# Patient Record
Sex: FEMALE | Race: BLACK OR AFRICAN AMERICAN | NOT HISPANIC OR LATINO | URBAN - METROPOLITAN AREA
[De-identification: names, ages, dates, MRNs, and addresses within clinical notes are randomized per-mention and may not be internally consistent; named-entity substitution may affect disease eponyms.]

---

## 2023-03-16 ENCOUNTER — OFFICE VISIT (OUTPATIENT)
Dept: PEDIATRICS | Facility: CLINIC | Age: 23
End: 2023-03-16
Payer: COMMERCIAL

## 2023-03-16 DIAGNOSIS — M54.9 BACK PAIN, UNSPECIFIED BACK LOCATION, UNSPECIFIED BACK PAIN LATERALITY, UNSPECIFIED CHRONICITY: Primary | ICD-10-CM
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
## 2023-03-16 PROCEDURE — 99213 OFFICE O/P EST LOW 20 MIN: CPT | Performed by: PEDIATRICS
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
## 2023-04-27 ENCOUNTER — TELEPHONE (OUTPATIENT)
Dept: PEDIATRICS | Facility: CLINIC | Age: 23
End: 2023-04-27
Payer: COMMERCIAL

## 2023-04-27 NOTE — TELEPHONE ENCOUNTER
TT MOM. THOUGHT SHE HAD MELANOMA. CALLED MOM IN [facility]. NAIL LOOKED DARK. SAW  DOCTOR AT [facility] TODAY AND WAS TOLD JUST BECAUSE SHE'S DARK-SKINNED. -CW

## 2025-05-08 ENCOUNTER — APPOINTMENT (OUTPATIENT)
Dept: PEDIATRICS | Facility: CLINIC | Age: 25
End: 2025-05-08
Payer: COMMERCIAL

## 2025-07-16 ENCOUNTER — APPOINTMENT (OUTPATIENT)
Dept: PEDIATRICS | Facility: CLINIC | Age: 25
End: 2025-07-16
Payer: COMMERCIAL

## 2025-07-16 VITALS
HEIGHT: 66 IN | DIASTOLIC BLOOD PRESSURE: 71 MMHG | SYSTOLIC BLOOD PRESSURE: 111 MMHG | BODY MASS INDEX: 20.06 KG/M2 | HEART RATE: 66 BPM | WEIGHT: 124.8 LBS

## 2025-07-16 DIAGNOSIS — Z91.89 IMMUNIZATION OVERDUE: ICD-10-CM

## 2025-07-16 DIAGNOSIS — Z30.09 COUNSELING FOR BIRTH CONTROL, ORAL CONTRACEPTIVES: ICD-10-CM

## 2025-07-16 DIAGNOSIS — F41.9 ANXIETY AND DEPRESSION: ICD-10-CM

## 2025-07-16 DIAGNOSIS — G47.00 INSOMNIA, UNSPECIFIED TYPE: ICD-10-CM

## 2025-07-16 DIAGNOSIS — F90.9 ATTENTION DEFICIT HYPERACTIVITY DISORDER (ADHD), UNSPECIFIED ADHD TYPE: ICD-10-CM

## 2025-07-16 DIAGNOSIS — F32.A ANXIETY AND DEPRESSION: ICD-10-CM

## 2025-07-16 DIAGNOSIS — Z00.00 WELL ADULT EXAM: Primary | ICD-10-CM

## 2025-07-16 PROCEDURE — 3008F BODY MASS INDEX DOCD: CPT | Performed by: PEDIATRICS

## 2025-07-16 PROCEDURE — 99213 OFFICE O/P EST LOW 20 MIN: CPT | Performed by: PEDIATRICS

## 2025-07-16 PROCEDURE — 99395 PREV VISIT EST AGE 18-39: CPT | Performed by: PEDIATRICS

## 2025-07-16 NOTE — PATIENT INSTRUCTIONS
Healthy young adult!!  - Vaccines today: YOU'RE OVER 18 SO YOUR INSURANCE WON'T PAY FOR VACCINES AT MY OFFICE, BUT YOU CAN PROBABLY GET THEM EITHER THROUGH THE [facility] OR EVEN TRY [facility]. YOU NEED BEXSERO #2 OF 2, TETANUS BOOSTER, AND HPV SERIES.   - BIRTH CONTROL: CONTINUE TO GET YOUR BIRTH CONTROL AND REGULAR GYN EXAMS THROUGH PLANNED PARENTHOOD  - DEPRESSION/ ANXIETY: CONTINUE PAXIL PER DORINA JAVIER AND TALK THERAPY   - ADHD: CONTINUE TO WORK THROUGH THIS WITH TALK THERAPY   - INSOMNIA: CONTINUE TRAZODONE PER DORINA JAVIER  - See you next year.

## 2025-07-16 NOTE — PROGRESS NOTES
"The patient is here alone today for routine health maintenance.   General Health: Overall in good health  Concerns today:   Significant PMHx: ON ADHD MEDICATION X 2 MONTHS. NOT IN EPIC. PRESCRIBED BY \"SAMIR\".   Interim Hx:  \"I AM IN THERAPY\"-- WORKING ON DEPRESSION, ANXIETY.     Nutrition: BALANCED  Dental Care: Has a dental home. Performs regular dental hygiene.  Sleep: \"I'M TIRED BUT CAN'T SLEEP\". TRAZODONE IS WORKING.   Behavior/ Socialization: Appropriate peer relationships. Parent-child-sibling interactions are normal. Has supportive adult relationship(s). Lives at HER OWN APT WITH .   Developmental: Does not receive educational accommodations. Social interaction is age-appropriate.   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    Work:   Activities:   Risk Assessment: Teen questionnaire completed and did not flag as abnormal.  Menstrual Status: Periods are regular Q month. LMP 7/. ELECTIVE  AT 4MOS GESTATION IN 2025. HAD TO DRIVE TO [place] FOR THE APPT.   Sex: BIRTH CONTROL PATCH (PER PLANNED PARENTHOOD). NO CONDOMS PREVIOUSLY, NOW USES.   Drugs/Alcohol Use:   Mental Health Assessment: Screening questionnaire for depression negative. Does not endorse thoughts of suicide or self-harm.  Safety: Uses safety belts in cars.     ROS:  Denies headaches, dizziness, syncope/ near syncope, stuffy/runny nose, sneezing, sore throat, coughing, chest pain, abnormal heart rate, abdominal pain, N/V/D, rashes, pain in extremities.      No questionnaires on file.     /71   Pulse 66   Ht 1.664 m (5' 5.5\")   Wt 56.6 kg (124 lb 12.8 oz)   BMI 20.45 kg/m²   Growth percentiles: Facility age limit for growth %edilma is 20 years. Facility age limit for growth %edilma is 20 years.   Constitutional: Well-developed, well nourished, adequately hydrated. No acute distress.   Head/face: Normocephalic, atraumatic.  Eyes: " Conjunctivae, sclerae, and lids WNL bilaterally. PERRL. EOMI.  ENT: No nasal discharge, TMs with normal color, landmarks, and reflectivity, without MEEs or retraction. EACs without edema, redness, or tenderness. Dentition intact. MMM. Tonsils WNL.  Neck: FROM, no significant cervical LILIBETH.  CV: Normal S1 and S2, RRR without M/R/G.  Pulm: No G/F/R. Easy, unlabored respirations without W/R/R/C. Good air exchange all over.   Abd: Soft, NT/ND, no HSM, no masses. Normal BS and tympany on exam.  : Normal exam for stated age and gender.  Neuro: CN grossly intact. Normal gait. Reflexes 2+ and symmetric.  Psych: Mood and affect normal.     Healthy young adult!!  - Vaccines today: YOU'RE OVER 18 SO YOUR INSURANCE WON'T PAY FOR VACCINES AT MY OFFICE, BUT YOU CAN PROBABLY GET THEM EITHER THROUGH THE [facility] OR EVEN TRY [facility]. YOU NEED BEXSERO #2 OF 2, TETANUS BOOSTER, AND HPV SERIES.   - BIRTH CONTROL: CONTINUE TO GET YOUR BIRTH CONTROL AND REGULAR GYN EXAMS THROUGH PLANNED PARENTHOOD  - DEPRESSION/ ANXIETY: CONTINUE PAXIL PER DORINA JAVIER AND TALK THERAPY   - ADHD: CONTINUE TO WORK THROUGH THIS WITH TALK THERAPY   - INSOMNIA: CONTINUE TRAZODONE PER DORINA JAVIER  - See you next year.   Nivia Soni MD